# Patient Record
Sex: FEMALE | Race: ASIAN | NOT HISPANIC OR LATINO | Employment: STUDENT | URBAN - METROPOLITAN AREA
[De-identification: names, ages, dates, MRNs, and addresses within clinical notes are randomized per-mention and may not be internally consistent; named-entity substitution may affect disease eponyms.]

---
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
## 2023-01-05 ENCOUNTER — OFFICE VISIT (OUTPATIENT)
Dept: PEDIATRIC CARDIOLOGY | Facility: CLINIC | Age: 18
End: 2023-01-05
Payer: COMMERCIAL

## 2023-01-05 ENCOUNTER — ANCILLARY PROCEDURE (OUTPATIENT)
Dept: CARDIOLOGY | Facility: CLINIC | Age: 18
End: 2023-01-05
Payer: COMMERCIAL

## 2023-01-05 VITALS
SYSTOLIC BLOOD PRESSURE: 139 MMHG | HEIGHT: 60 IN | OXYGEN SATURATION: 98 % | RESPIRATION RATE: 18 BRPM | BODY MASS INDEX: 21.08 KG/M2 | DIASTOLIC BLOOD PRESSURE: 82 MMHG | WEIGHT: 107.36 LBS | HEART RATE: 91 BPM

## 2023-01-05 DIAGNOSIS — Q24.9 ADULT CONGENITAL HEART DISEASE: ICD-10-CM

## 2023-01-05 DIAGNOSIS — Z87.74 S/P VSD CLOSURE: ICD-10-CM

## 2023-01-05 DIAGNOSIS — Q21.0 VSD (VENTRICULAR SEPTAL DEFECT): ICD-10-CM

## 2023-01-05 DIAGNOSIS — Q21.0 VSD (VENTRICULAR SEPTAL DEFECT): Primary | ICD-10-CM

## 2023-01-05 PROCEDURE — G0463 HOSPITAL OUTPT CLINIC VISIT: HCPCS | Mod: 25

## 2023-01-05 PROCEDURE — 99204 OFFICE O/P NEW MOD 45 MIN: CPT | Mod: 25

## 2023-01-05 PROCEDURE — 93010 ELECTROCARDIOGRAM REPORT: CPT

## 2023-01-05 PROCEDURE — 93325 DOPPLER ECHO COLOR FLOW MAPG: CPT | Mod: 26 | Performed by: PEDIATRICS

## 2023-01-05 PROCEDURE — 93325 DOPPLER ECHO COLOR FLOW MAPG: CPT

## 2023-01-05 PROCEDURE — 93303 ECHO TRANSTHORACIC: CPT | Mod: 26 | Performed by: PEDIATRICS

## 2023-01-05 PROCEDURE — 93320 DOPPLER ECHO COMPLETE: CPT | Mod: 26 | Performed by: PEDIATRICS

## 2023-01-05 ASSESSMENT — PAIN SCALES - GENERAL: PAINLEVEL: NO PAIN (0)

## 2023-01-05 NOTE — NURSING NOTE
Cardiac Testing: Patient given instructions regarding  echocardiogram . Discussed purpose, preparation, procedure and when to expect results reported back to the patient. Patient demonstrated understanding of this information and agreed to call with further questions or concerns.    Return Appointment: Patient given instructions regarding scheduling next clinic visit. Patient demonstrated understanding of this information and agreed to call with further questions or concerns.    Patient stated she understood all health information given and agreed to call with further questions or concerns.

## 2023-01-05 NOTE — NURSING NOTE
"Informant-    Kate is accompanied by mother    Reason for Visit-  VSD    Vitals signs-  /82   Pulse 91   Resp 18   Ht 1.517 m (4' 11.72\")   Wt 48.7 kg (107 lb 5.8 oz)   SpO2 98%   BMI 21.16 kg/m      There are concerns about the child's exposure to violence in the home: No    Need Flu Shot: No    Need MyChart: No    Does the patient need any medication refills today? No    Face to Face time: 5 minutes  Jaye Oneal MA            "

## 2023-01-05 NOTE — PROGRESS NOTES
Adult Congenital Heart Disease Clinic Visit     RE: Kate Parks    MRN: [id number]   : 2005      Dear Dr. Webster:      I had the pleasure of seeing Kate Parks and her mother, Lesa, at the [facility] Adult Congenital and Cardiovascular Genetics Clinic at [facility] on 2023. Kate is a 16 yo young woman who underent operative repair of a ventricular septal defect at age 1 year in [place]. She was adopted by her family at just over 1 years of age (she arrived home on 2006). She was initially followed by Dr. Carrasco and has been doing well from a cardiovascular standpoint. They are here today for a scheduled follow up visit, last seen 2019. Over the last 3-4 years she has been very healthy. She had an AXR 2022 for abdominal pain, which has begun to improve since with constipation management. Kate also had her 2 wisdom teeth removed on 2022, which was uncomplicated. Otherwise, she has no other hospitalizations or surgeries. No chronic illnesses. Her one concern involves instances of pain that occurs with breathing about once a month or once every other month. She describes it as a sharp pain located on her sternum, and it only lasts for one to two breaths before spontaneously resolving. It does not inhibit her in any way. She has no other cardiovascular concerns. Kate denies other instances of chest pain, shortness of breath, palpitations, syncope, or exercise intolerance. She states she could be more active, but tries to walk quickly between classes and occasionally takes the stairs. Kate graduated from high school and attends college as a freshman at [employer], majoring in business and econ.  She is on no medications and has no known drug allergies. She continues to have regular menstrual periods.     PMH: as above    FH: adopted, no family history available    SH: Has 2 older brothers. Denies EtOH, smoking, vaping or other drugs.  "Single. College freshman, good grades.      PHYSICAL EXAMINATION:     VITAL SIGNS: Vitals: /82   Pulse 91   Resp 18   Ht 1.517 m (4' 11.72\")   Wt 48.7 kg (107 lb 5.8 oz)   SpO2 98%   BMI 21.16 kg/m    BMI= Body mass index is 21.16 kg/m .  GENERAL:  Well appearing young woman, no central or peripheral cyanosis.   Neck supple  LUNGS:  Her lungs are clear to auscultation without any crackles or wheezes.   CARDIOVASCULAR:  Well healed midline sternotomy scar. No sternal wires palpable. Her cardiac exam reveals a regular rate and rhythm with a normal S1, S2 with physiologic splitting, no murmurs, rubs or gallops appreciated.     ABDOMEN:  Her abdomen is soft, nontender, nondistended. No HSM.   EXTREMITIES:  Her extremities are warm and well perfused with 2+ pulses in upper and lower extremities bilateral.      EKG shows normal sinus rhythm with a rate of 72 bpm. Intervals are within normal limits and there is no chamber enlargement or hypertrophy.    An echocardiogram performed today showed normal left ventricular systolic function, no shunts and mild MR. Overall no change compared to last echocardiogram.  Final Report:  S/p VSD closure ([place]) There is no residual ventricular level shunting. The  left and right ventricles have normal chamber size, wall thickness, and  systolic function. Mild (1+) mitral valve insufficiency. The mitral valve is  normal in appearance and motion. Normal right ventricular systolic pressure.  No pericardial effusion.    Final report  Recent Results (from the past 4320 hour(s))   ECHO Congenital Transthoracic (TTE)    Narrative    [id number]  [id number]  [id number]  [id number]^[id number]^JYOTHI^NICKOLAS                                                               Study ID: [id number]                                                 [facility] [address].              "                                   [place] [address]                                                Phone: [phone]                                Pediatric Echocardiogram  ______________________________________________________________________________  Name: ARTURO ROBERT  Study Date: 2023 09:27 AM                     Patient Location: [facility]  MRN: [id number]                                     Age: 17 yrs  : 2005                                     BP: 139/80 mmHg  Gender: Female                                      HR: 91  Patient Class: Outpatient                           Height: 152 cm  Ordering Provider: JYOTHI ENNIS                Weight: 49 kg  Referring Provider: JYOTHI ENNIS               BSA: 1.4 m2  Performed By: Winnie Devine RDCS  Report approved by: Niurka Mckeon MD  Reason For Study: VSD (ventricular septal defect), S/P VSD closure, Adult  congenit  ______________________________________________________________________________  ##### CONCLUSIONS #####  S/p VSD closure ([place]) There is no residual ventricular level shunting. The  left and right ventricles have normal chamber size, wall thickness, and  systolic function. Mild (1+) mitral valve insufficiency. The mitral valve is  normal in appearance and motion. Normal right ventricular systolic pressure.  No pericardial effusion.  ______________________________________________________________________________  Technical information:  A complete two dimensional, MMODE, spectral and color Doppler transthoracic  echocardiogram is performed. The study quality is good. Images are obtained  from parasternal, apical, subcostal and suprasternal notch views. Prior  echocardiogram available for comparison. ECG tracing shows regular rhythm.     Segmental Anatomy:  There is normal atrial arrangement, with concordant atrioventricular and  ventriculoarterial connections.     Systemic and pulmonary veins:  The systemic  venous return is normal. Normal coronary sinus. Color flow  demonstrates flow from at least one pulmonary vein entering the left atrium.     Atria and atrial septum:  Normal right atrial size. The left atrium is normal in size. There is no  obvious atrial level shunting. The atrial septum is not well visualized.     Atrioventricular valves:  The tricuspid valve is normal in appearance and motion. Trivial tricuspid  valve insufficiency. Normal right ventricular systolic pressure. The mitral  valve is normal in appearance and motion. Mild (1+) mitral valve  insufficiency.     Ventricles and Ventricular Septum:  The left and right ventricles have normal chamber size, wall thickness, and  systolic function. Post primary closure of ventricular septal defect. There is  no residual ventricular level shunt.     Outflow tracts:  Normal great artery relationship. There is unobstructed flow through the right  ventricular outflow tract. The pulmonary valve motion is normal. There is  normal flow across the pulmonary valve. Trivial pulmonary valve insufficiency.  There is unobstructed flow through the left ventricular outflow tract.  Tricuspid aortic valve with normal appearance and motion. There is normal flow  across the aortic valve.     Great arteries:  The main pulmonary artery has normal appearance. There is unobstructed flow in  the main pulmonary artery. The pulmonary artery bifurcation is normal. There  is unobstructed flow in both branch pulmonary arteries. Normal ascending  aorta. The aortic arch appears normal. There is unobstructed antegrade flow in  the ascending, transverse arch, descending thoracic and abdominal aorta.     Arterial Shunts:  The ductal region is not imaged with this study.     Coronaries:  The coronary arteries are not evaluated.     Effusions, catheters, cannulas and leads:  No pericardial effusion.     MMode/2D Measurements & Calculations  LVMI(BSA): 58.4 grams/m2                     "LVMI(Height): 27.2  RWT(MM): 0.38     Doppler Measurements & Calculations  Ao V2 max: 106.0 cm/sec                 LV V1 max: 74.5 cm/sec  Ao max P.5 mmHg                     LV V1 max P.2 mmHg  TV E max cheryl: 53.3 cm/sec               PA V2 max: 128.0 cm/sec  TV A max cheryl: 38.5 cm/sec               PA max P.6 mmHg  TR max cheryl: 221.0 cm/sec                LPA max cheryl: 77.0 cm/sec  TR max P.5 mmHg                    LPA max P.4 mmHg                                          RPA max cheryl: 81.4 cm/sec                                          RPA max P.7 mmHg     asc Ao max cheryl: 75.3 cm/sec           desc Ao max cheryl: 103.0 cm/sec  asc Ao max P.3 mmHg               desc Ao max P.2 mmHg  MPA max cheryl: 109.0 cm/sec  MPA max P.8 mmHg     [place] Z-Scores (Measurements & Calculations)  Measurement NameValue     Z-ScorePredictedNormal Range  IVSd(MM)        0.76 cm   -0.78  0.86     0.60 - 1.11  LVIDd(MM)       3.9 cm    -2.0   4.6      3.9 - 5.2  LVIDs(MM)       2.7 cm    -0.77  3.0      2.4 - 3.6  LVPWd(MM)       0.76 cm   -0.44  0.80     0.59 - 1.02  LV mass(C)d(MM) 84.2 grams-1.8   119.2    81.4 - 174.5  FS(MM)          30.8 %    -1.3   35.0     28.7 - 42.6     Report approved by: Ileana Foley 2023 09:50 AM             In summary,  Kate is a 17-year-old with a perimembranous VSD repaired in early childhood in [place]. She is asymptomatic from a cardiovascular standpoint, active and thriving. Described symptoms of sharp pleuritic chest pain most consistent with a pleuritic \"catch\" or  costochondritis, without symptoms typical for heart involvement. Discussed signs/symptoms to watch for relating to any chest pain or irregular rhythm in the future that would warrant additional evaluation. She does have mild mitral valve insufficiency that should be monitored.    Long term, patients with repaired VSD's are at risk for arrhythmias, systolic and diastolic cardiac dysfunction and " should be followed in a cardiology clinic life-long.     Recommendations:  Continue regular physical activity - minimum 30 minutes daily, 5 days per week.    Regular dental cleanings- no antibiotic prophylaxis required.   No restrictions for contraceptive use if needed or desired.  Discussed need for pregnancy screening with fetal echo and slightly increased  risk of CHD in children when mothers have CHD  Discussed risks of smoke, nicotine, etoh exposure  Discussed need for regular cardiology follow up throughout life, including prior to pregnancy.   Should find an Adult Congenital Cardiology Physician (ACHD) near her if she relocated for graduate school or work in the future   Recommend cardiac MRI  in young adulthood (prior to pregnancy).      Return to [facility] clinic  in 3 years with ECG and echo, sooner if new symptoms or concerns.     It was a pleasure to meet with Kate and her mother today. Thank you for allowing me to participate in Kate's care.      I, Braden Foss MD, saw this patient with the resident, Dr. Frank and agree with the  findings and plan of care as documented in this note .I have reviewed this patient's history, examined the patient and reviewed relevant laboratory findings and diagnostic testing. I have discussed the plan of care with the patient and her mother who are present at the time of this visit. I have reviewed and edited this note.     Sincerely,     Braden Foss M.D.   of Pediatrics  Pediatric and Adult Congenital Cardiology  [facility]  Pediatric Cardiology Office [phone]  Adult Congenital Cardiology Triage and Scheduling [phone]    A total of 30 minutes were spent in personal review of testing, including echo and ECG, review of documented history and interval events in chart, additional history from her mother, face to face visit with discussion of findings and plan, and  documentation.     CC: Kate Parks

## 2023-01-05 NOTE — PATIENT INSTRUCTIONS
You were seen today in the Adult Congenital and Cardiovascular Genetics Clinic at the [facility].    Cardiology Providers you saw during your visit:  Braden Foss MD    Diagnosis:  VSD    Results:  Braden Foss MD reviewed the results of your EKG and echo testing today in clinic.     Cardiac Recommendations:  Continue to eat a heart healthy, low salt diet.  Continue to get 20-30 minutes of aerobic activity, 4-5 days per week.  Examples of aerobic activity include walking, running, swimming, cycling, etc.  Continue to observe good oral hygiene, with regular dental visits.      SBE prophylaxis:   Yes____  No__x__      Follow-up:  Follow up with Dr Foss in 3 years with echo and EKG prior    If you have questions or concerns please contact us at:    Annia Garcia, RN, BSN   Joanna Almendarez (Scheduling)  Nurse Care Coordinator     Clinic   Adult Congenital and CV Genetics   Adult Congenital and CV Genetic  [facility]   [facility]  (P) [phone]     (P) [phone]            (F) [phone]        For after hours urgent needs, call [phone] and ask to speak to the Adult Congenital Physician on call.  Mention Job Code [id number].    For emergencies call 911.    [facility]  [facility]  Mail Code [id number]  [address] [place]  [address]

## 2023-04-16 ENCOUNTER — HEALTH MAINTENANCE LETTER (OUTPATIENT)
Age: 18
End: 2023-04-16

## 2024-06-23 ENCOUNTER — HEALTH MAINTENANCE LETTER (OUTPATIENT)
Age: 19
End: 2024-06-23

## 2024-10-14 ENCOUNTER — LAB REQUISITION (OUTPATIENT)
Dept: LAB | Facility: CLINIC | Age: 19
End: 2024-10-14
Payer: COMMERCIAL

## 2024-10-14 ENCOUNTER — LAB REQUISITION (OUTPATIENT)
Dept: LAB | Facility: CLINIC | Age: 19
End: 2024-10-14

## 2024-10-14 DIAGNOSIS — E55.9 VITAMIN D DEFICIENCY, UNSPECIFIED: ICD-10-CM

## 2024-10-14 DIAGNOSIS — R10.9 UNSPECIFIED ABDOMINAL PAIN: ICD-10-CM

## 2024-10-14 DIAGNOSIS — R19.7 DIARRHEA, UNSPECIFIED: ICD-10-CM

## 2024-10-14 LAB
CRP SERPL-MCNC: <3 MG/L
ERYTHROCYTE [SEDIMENTATION RATE] IN BLOOD BY WESTERGREN METHOD: 19 MM/HR (ref 0–20)
VIT D+METAB SERPL-MCNC: 16 NG/ML (ref 20–50)

## 2024-10-14 PROCEDURE — 86140 C-REACTIVE PROTEIN: CPT | Mod: ORL | Performed by: STUDENT IN AN ORGANIZED HEALTH CARE EDUCATION/TRAINING PROGRAM

## 2024-10-14 PROCEDURE — 82784 ASSAY IGA/IGD/IGG/IGM EACH: CPT | Mod: ORL | Performed by: STUDENT IN AN ORGANIZED HEALTH CARE EDUCATION/TRAINING PROGRAM

## 2024-10-14 PROCEDURE — 83516 IMMUNOASSAY NONANTIBODY: CPT | Mod: ORL | Performed by: STUDENT IN AN ORGANIZED HEALTH CARE EDUCATION/TRAINING PROGRAM

## 2024-10-14 PROCEDURE — 82306 VITAMIN D 25 HYDROXY: CPT | Performed by: STUDENT IN AN ORGANIZED HEALTH CARE EDUCATION/TRAINING PROGRAM

## 2024-10-14 PROCEDURE — 85652 RBC SED RATE AUTOMATED: CPT | Mod: ORL | Performed by: STUDENT IN AN ORGANIZED HEALTH CARE EDUCATION/TRAINING PROGRAM

## 2024-10-16 LAB
GLIADIN IGA SER-ACNC: 0.7 U/ML
GLIADIN IGG SER-ACNC: <0.6 U/ML
IGA SERPL-MCNC: 122 MG/DL (ref 84–499)
TTG IGA SER-ACNC: <0.2 U/ML
TTG IGG SER-ACNC: <0.6 U/ML

## 2025-07-12 ENCOUNTER — HEALTH MAINTENANCE LETTER (OUTPATIENT)
Age: 20
End: 2025-07-12